# Patient Record
Sex: MALE | Race: WHITE | NOT HISPANIC OR LATINO | Employment: FULL TIME | URBAN - METROPOLITAN AREA
[De-identification: names, ages, dates, MRNs, and addresses within clinical notes are randomized per-mention and may not be internally consistent; named-entity substitution may affect disease eponyms.]

---

## 2023-12-23 ENCOUNTER — ANCILLARY PROCEDURE (OUTPATIENT)
Dept: RADIOLOGY | Facility: CLINIC | Age: 48
End: 2023-12-23
Payer: COMMERCIAL

## 2023-12-23 DIAGNOSIS — R05.9 COUGH, UNSPECIFIED TYPE: ICD-10-CM

## 2023-12-23 PROCEDURE — 71046 X-RAY EXAM CHEST 2 VIEWS: CPT | Performed by: RADIOLOGY

## 2023-12-23 PROCEDURE — 71046 X-RAY EXAM CHEST 2 VIEWS: CPT

## 2024-02-12 ENCOUNTER — APPOINTMENT (OUTPATIENT)
Dept: OTOLARYNGOLOGY | Facility: CLINIC | Age: 49
End: 2024-02-12
Payer: COMMERCIAL

## 2024-05-07 ENCOUNTER — OFFICE VISIT (OUTPATIENT)
Dept: OTOLARYNGOLOGY | Facility: CLINIC | Age: 49
End: 2024-05-07
Payer: COMMERCIAL

## 2024-05-07 VITALS — BODY MASS INDEX: 25.18 KG/M2 | HEIGHT: 69 IN | WEIGHT: 170 LBS

## 2024-05-07 DIAGNOSIS — J34.89 NASAL CONGESTION WITH RHINORRHEA: ICD-10-CM

## 2024-05-07 DIAGNOSIS — J31.0 CHRONIC RHINITIS: ICD-10-CM

## 2024-05-07 DIAGNOSIS — J30.89 ALLERGIC RHINITIS DUE TO OTHER ALLERGIC TRIGGER, UNSPECIFIED SEASONALITY: ICD-10-CM

## 2024-05-07 DIAGNOSIS — R09.81 NASAL CONGESTION WITH RHINORRHEA: ICD-10-CM

## 2024-05-07 DIAGNOSIS — R53.83 OTHER FATIGUE: Primary | ICD-10-CM

## 2024-05-07 PROCEDURE — 31231 NASAL ENDOSCOPY DX: CPT | Performed by: OTOLARYNGOLOGY

## 2024-05-07 PROCEDURE — 1036F TOBACCO NON-USER: CPT | Performed by: OTOLARYNGOLOGY

## 2024-05-07 PROCEDURE — 99204 OFFICE O/P NEW MOD 45 MIN: CPT | Performed by: OTOLARYNGOLOGY

## 2024-05-07 RX ORDER — LORATADINE 10 MG/1
10 TABLET ORAL DAILY
COMMUNITY

## 2024-05-07 RX ORDER — TRIAMCINOLONE ACETONIDE 55 UG/1
2 SPRAY, METERED NASAL DAILY
COMMUNITY

## 2024-05-07 ASSESSMENT — PATIENT HEALTH QUESTIONNAIRE - PHQ9
1. LITTLE INTEREST OR PLEASURE IN DOING THINGS: NOT AT ALL
SUM OF ALL RESPONSES TO PHQ9 QUESTIONS 1 AND 2: 0
2. FEELING DOWN, DEPRESSED OR HOPELESS: NOT AT ALL

## 2024-05-07 NOTE — PROGRESS NOTES
Chief Complaint:  Left-sided foreign body sensation    History Of Present Illness:  Reason For Visit:       Mariely presents to my clinic as a self-referred new patient evaluation for an intermittent sensation of a left-sided nasal foreign body.    He has a baseline history of allergies and typically during the spring he will use a combination of Nasacort, Claritin, and an eyedrop for the symptoms.  About once per year over the last 4 years (typically when he is not on his allergy medication) he will get a sensation in his left nasal cavity that would last between 2 and 3 days.  That sensation is like he has pepper in that nostril and can include left-sided discomfort, congestion, and eye related symptoms such as watering.  He does not notice eye redness during these events.  The last event was about 4 to 6 months ago.  Typically when this happens, he will resume Nasacort, Claritin, and his eyedrops with improvement.    He also mentioned not getting restful sleep (7/10) and wish to discuss whether or not this was an ENT issue.    Main Symptoms:  Patient has intermittent anterior nasal drainage that he attributes to his allergies.   Patient does not have  posterior nasal drainage.    Patient has mild nasal airway obstruction.   Patient does not have  facial pain.  Patient does not have  facial pressure.    Patient does not have decreased sense of smell.   Associated Symptoms:   Patient has  headaches.   Patient does not have throat clearing.    Patient does not have coughing.    Patient does not have dysphonia.   Patient does not have consistent nasal bleeding, but he has bled during the winter when he is dry.    Medications currently on for sinonasal symptoms: Claritin from March-July and sometimes in the fall, Nasacort 2 puffs each side Qday from March to July, allergy eye drops PRN, saline spray    Medications tried in the past for sinonasal symptoms:  Flonase, Zyrtec (discontinued secondary to side effects with  that medication)     Other Pertinent Medical Conditions:   Patient does not have asthma.    Patient does not have aspirin sensitivity.    Patient has migraines once per month. Patient is followed by a neurologist.   Patient does not have history of allergy testing.   Patient does not have history of sinus surgery.    Patient does not have history of nasal fracture.    Patient does not have heartburn.    The patient does not take medical therapy for heartburn.   The patient does not have imaging of sinuses.     Active Problems:  There is no problem list on file for this patient.     Past Medical History:  He has no past medical history on file.    Surgical History:  He has no past surgical history on file.     Family History:  No family history on file.    Social History:  He has no history on file for tobacco use, alcohol use, and drug use.     Allergies:  Patient has no allergy information on record.    Current Meds:  No current outpatient medications on file.    Vitals:  There were no vitals taken for this visit.     Physical Exam:  CONSTITUTIONAL:  Vitals reviewed in nursing chart, well developed, well nourished.    RESPIRATION:  Breathing comfortably, no stridor.  CV:  No clubbing/cyanosis/edema in hands.  EYES:  EOM Intact, sclera normal.  NEURO:  Alert and oriented times 3, Cranial nerves 2-12 intact and symmetric bilaterally.  HEAD AND FACE:  Skin with no masses or lesions, sinuses nontender to palpation.  SALIVARY GLANDS:  Parotid and submandibular glands normal bilaterally.  EARS:  Normal external ears, external auditory canals, and TMs to otoscopy, normal hearing to whispered voice.  NOSE:  External nose midline, anterior rhinoscopy is normal with limited visualization to the anterior aspect of the interior turbinates (see nasal endoscopy).  ORAL CAVITY/OROPHARYNX/LIPS:  Normal mucous membranes, normal floor of mouth/tongue/OP, no masses or lesions are noted.  NECK/LYMPH:  No LAD, no thyroid  masses.    SINONASAL ENDOSCOPY (CPT 32897):  To better evaluate the patient's symptoms, sinonasal endoscopy is indicated.  After discussion of risks and benefits, and topical decongestion and anesthesia, an endoscope was used to perform nasal endoscopy on each side.  A time out identifying the patient, the procedure, the location of the procedure and any concerns was performed prior to beginning the procedure.    Findings:  Examination of the right nasal cavity revealed anterior septal dryness.  The middle meatus and sphenoethmoid recess were normal without pus or polyps.  Nasopharynx normal.  Examination left nasal cavity revealed a normal middle meatus and sphenoethmoid recess without pus or polyps.  Nasopharynx was normal through the left nasal cavity.    Provider Impressions:  1.  Intermittent left nasal cavity foreign body sensation  2.  Rhinorrhea; rhinitis  3.  Nasal airway obstruction  4.  Migraines  5.  Seasonal allergic rhinitis  6.  Daytime fatigue    Discussion:  Mariely Santos and I discussed his current symptoms and exam.  He gets a few days of a foreign body sensation in the left nasal cavity about once per year and we discussed possible causes.  I did not see pus or polyps today.  His septum had some mild deviations but nothing significant.  His left nasal cavity may be slightly smaller than the right.  I am very suspicious that his symptoms are related to an excessive allergic exposure or potentially inhalation of something irritating.  We discussed several medical options including continuation of his current intranasal medical therapy and observation, more consistent use of his current intranasal medical therapy, or alternatives.  We discussed Astepro that is available over-the-counter and is a topical antihistamine.  He was comfortable beginning this and using a combination with Nasacort.  I left it up to him whether or not he wished to use both of these sprays consistently in the hopes  this would eliminate exacerbations.  We reviewed appropriate administration technique and dosing parameters.    In regard to imaging, I would hold off in the short-term but if the exacerbations become more frequent and more bothersome I think that indicating would be indicated.    In regard to his daytime fatigue, I recommended a sleep medicine consult with the expectation that he would undergo a polysomnogram.  We discussed the rationale for this.  This was ordered today.    As for next steps, if he continues to get intermittent exacerbations, he certainly could consider saline rinses for the events as this would be more active than saline spray.    I would like to see him virtually in about 6 months.  He was amenable to this and all questions were answered.    Patient Discussion/Summary:  Welcome to Dr. Ion Barth's clinic. We are here to assist you with your ENT needs at [facility] [place]. Dr. Barth is an ENT that specializes in nose, sinus, and skull base disorders.    Dr. Ion Barth's office number is [phone]. Please call this number to contact his care team regardless of which office you use to access care. This number is the most direct way to communicate with all the members of the care team.    Haydee Yee CNP is a nurse practitioner who is a part of Dr. Barth's team. She will work collaboratively with Dr. Barth to meet your goals. This often may include seeing you for more urgent appointments or follow-up visits under Dr. Barth's supervision.    Sania CHONGN is Dr. Barth's primary nurse. She can be reached by calling [phone]. Sania is available during business hours Tuesday through Friday. Haydee CHONGN is her rhinology nurse partner. Haydee is available during business hours Monday through Thursday. Messages left for them will be returned within one business day. Sania is also Dr. Barth's surgery scheduler and will  assist you with planning and scheduling of your surgery during her office hours.     Gely Morales is Dr. Barth's  and you can reach her at [phone]. She can help you with scheduling of appointments, general questions and information. She is available to receive calls Monday through Friday from 8:00 am until 4:25 pm.     For your convenience, Dr. Barth sees patients at [facility] and [facility] at [facility]. While we try to make your appointments as convenient as possible, occasionally a visit to another location may be necessary to provide the best care for you.    Dr. Barth makes every effort to run on time for your appointments. Therefore, if you are more than 25 minutes late, your appointment will need to be rescheduled to another day. We appreciate your understanding.     We look forward to working with you to meet your healthcare goals.     Signature:  Scribe Attestation  By signing my name below, Lucila GARCIA Scribe   attest that this documentation has been prepared under the direction and in the presence of Ion Barth MD.

## 2024-07-22 NOTE — PROGRESS NOTES
"       [facility]  [facility]  [address]  [facility] [address] [phone]     [facility]  New Visit Note      Subjective   Patient ID: Mariely Santos is a 49 y.o. male with past medical history significant for Sleep disorder breathing.     2024: The patient is here {pxarrival:62447} and was referred by Otolaryngology  Ion Barth MD  for comprehensive sleep medicine evaluation due to {SleepCC:35418}. ESS: MONICA:  , and neck circumference is  inches  today.    HPI  {OSAhx:70353}      SLEEP STUDY HISTORY: (personally reviewed raw data such as interpretation report, data sheet, hypnogram, and titration table if available and applicable)  ***    SLEEP-WAKE SCHEDULE  Bedtime: *** on weekdays, *** on weekends  Subjective sleep latency: ***  Problems falling asleep: ***  Number of awakenings: *** times per night spontaneously for unknown reasons  Falls back asleep in ***  Problems staying asleep: ***  Final wake time: *** on weekdays, *** on weekends  Out of bed time: *** on weekdays, *** on weekends  Shift work: ***  Naps: ***  Feels rested after a nap: {Yes/No:68430}  Average sleep duration (excluding naps): ***    SLEEP ENVIRONMENT  Sleep location: ***  Sleep status: {sleep status:88901}  Room is dark:  {Yes/No:76839::\"Yes\"}  Room is quiet: {Yes/No:94148::\"Yes\"}  Room is cool: {Yes/No:37259::\"Yes\"}  Bed comfort: good    SLEEP HABITS:   Activities before bedtime: ***  Activities in bed: ***  Preferred sleep position: {Sleep position:85644}    SLEEP ROS:  Night symptoms: {sleep apnea ROS at night:71666}  Morning symptoms: {sleep apnea ROS in mornin}  Daytime symptoms {sleep apnea ROS at daytime:57911}  Hypersomnia / narcolepsy symptoms: {narcolepsy ROS:13914}  RLS symptoms: {RLS symptoms:79003}  Movements in sleep: {sleep movements:22911}  Parasomnia symptoms: {parasomnia " ROS:29578}    WEIGHT: {weight:80542}    REVIEW OF SYSTEMS: All other systems have been reviewed and are negative.    PERTINENT SOCIAL HISTORY:  Occupation: employment status:35098}  Smoking: {Yes/No:85351}  ETOH: {Yes/No:61806}  Marijuana: {Yes/No:95264}  Caffeine: ***  Sleep aids: {Yes/No:64649}  Claustrophobia: {Yes/No:12399}    PERTINENT PAST SURGICAL HISTORY:  {surgical history pertinent in sleep:52399}    PERTINENT FAMILY HISTORY:  {family history in sleep:63155}    Active Problems, Allergy List, Medication List, and PMH/PSH/FH/Social Hx have been reviewed and reconciled in chart. No significant changes unless documented in the pertinent chart section. Updates made when necessary.       Objective     Physical Exam  Constitutional:alert and oriented to time, place, and person  Sinus: *** tenderness to palpation  Palate: Normal / Narrow / High-arched / *** torus palatini  Mallampati ***, *** Tongue scalloping, *** macroglossia  Lungs: Clear to auscultation bilateral, no rales  Heart: Regular rate and rhythm, no murmurs    Assessment/Plan   Mariely Santos is a 49 y.o. male who is seen to evaluate for ***possible/severe/ moderate/mild obstructive sleep apnea. The pathophysiology of sleep apnea, diagnostic testing (HST vs PSG), treatment options (PAP, oral appliance, surgery, hypoglossal nerve stimulator called Inspire), and supportive management (weight loss, positional therapy, smoking cessation, avoidance of alcohol and sedatives) were discussed with the patient in detail. Risk factors of sleep apnea as well as cardiometabolic and neurocognitive sequelae associated with untreated sleep apnea were also discussed. Lastly, patient was advised to avoid driving vehicle or operating heavy machinery when sleepy.     Mariely Santos with the following problems:     # SLEEP DISORDERED BREATHING:  -This is likely sleep apnea based on the the history and physical examination.   -Mariely Santoshas not yet  had a sleep study.  -Instruct patient to complete home/ in lab/ split night/ titration sleep study.  -HSAT is reasonable as patient likely has BITA based on history and exam and does not have any of the following comorbidities: CHF, neuromuscular weakness, hypoventilation, or significant COPD.  -We consider treatment as indicated when testing is complete.     # SLEEP APNEA:  -Per CMS criteria, the patient is not eligible for a pap to treat her Obstructive sleep apnea.   -Start/ Continue [] cmH20 []PAP through [email or web address].  -Sleep apnea and PAP therapy education were provided at length in the clinic today. Mariely Santos verbalized understanding.  -Emphasized diet, exercise, and weight loss in the clinic, as were non-supine sleep, avoiding alcohol in the late evening, and driving or operating heavy machinery when sleepy.  Mariely Santosverbalizes understanding of the above instructions and risks.    # BARIATRIC SURGERY INSTRUCTION:  -Please continuous using your CPAP to treat your sleep apnea.  -Bring your PAP and all equipment with you to surgery.  -Use your PAP with surgery and during recovery.  -Keep your head of the bed at 30* or higher.  -I advise judicious use of pain medications post operatively as pain medications can make sleep apnea worse.    -I advise close monitoring of the patient post operatively due to increased risk of complications related to sleep apnea.   -Mariely Santos may be at higher risk of postoperative respiratory complications.Mariely Santos understands the risk of post operative complications are higher for Mariely Santos is at increased but not prohibitive risk due to BITA.   -Mariely Hermes Moravan is optimized on PAP therapy for sleep apnea as long as Mariely Santos is compliant with PAP use per most recent download.  -Mariely Santos verbalizes understanding of the above instructions and risks.     # CHRONIC SLEEP  "ONSET/ SLEEP MAINTENANCE INSOMNIA:  -likely due to poor sleep hygiene, irregular sleep schedule, depression, anxiety, untreated sleep apnea, nocturia, RLS, delayed sleep phase syndrome, and chronic pain. [Meds] may be a contributing factor as well.  -MONICA:  -Sleep hygiene discuss in the clinic.    # DEPRESSION and ANXIETY:   -Mariely Kay taking [] and participating in psychotherapy.  -Denies HI/SI     # HYPERTENSION/ ATRIAL FIBRILLATION/ CAD/ CHF:  -Blood pressure was controlled today. To control her blood pressure better, instruct the patient to take anti-hypertension medication at bedtime and a water pill in the waking time.  -Denies headache, palpitation, and syncope in the clinic.  -Last Echo:  -Follows with PCP/ Cardiology     # MORBID OBESITY/OBESITY /OVERWEIGHT:  -with a BMI of []. Mariely Ochoalivan most recent Bicarbonate was No results found for: \"CO2\"   -Encourage to have regular exercise to manage weight well.  -Refer to a nutritionist for weight control.  -Bariatric surgery candidate.    # NASAL CONGESTION:  -Instruct Mariely Mirza Carlos Manuel use appropriate Flonase spray to ease congestion.  -Refer to Otolaryngology for underlying investigation.    # XEROSTOMIA:  -Instruct Mariely Ochoaheather purchase the Biotene gel to ease the dry mouth symptom,     # TOBACCO ABUSE:Mariely Santos is a current [] PPD smoker for [] years.  -Smoking Cessation given  -Decline Smoking Cessation     # RESTLESS LEG SYNDROME: This occurs frequently.  No results found for: \"IRON\", \"TRANSFERRIN\", \"IRONSAT\", \"TIBC\", \"FERRITIN\"  We will check a ferritin level today and start OTC iron replacement to ferritin of >75 as indicated.  Caffeine []. Eliminate  Tobacco []. Smoking cessation counseling provided.  Mariely Hermes Silver I discussed Mariely Santoscurrent medications that could be exacerbating Mariely Santos symptoms. Will continue [] for now.  Associated diseases [] and response " [].  Pramipexole  Ropinirole  Rotigitine  Gabapentin  Pregabalin  Opioids  Benzos     # CIRCADIAN RHYTHM SLEEP-WAKE DISORDER:  -We will obtain sleep logs for two weeks to be brought to next clinic to discuss. We will consider actigraphy to compare and/or confirm sleep log findings.  Delayed Sleep Phase:   -Set wake time, light therapy in the morning.   -Sleep hygiene measures at set bedtime.  Advanced Sleep Phase:   -Set bedtime and light therapy in the evening.   -Sleep hygiene measures at set bedtime.  Irregular Sleep Phase:  -Set bedtime and wake time.   -Daytime routine including scheduled physical activity, social activity and meal times.  Non-24 Sleep-Wake Rhythm:  -Set bedtime and wake time.   -Daytime routine including scheduled physical activity, social activity and meal times.  Shift Work:  -Maximize sleep time to 7-8 hours.   -Make sure the room is dark, quiet, cool and interruptions are eliminated.   -Avoid light in the mornings, wear dark sunglasses driving home.   -Expose self to bright light or daylight upon waking.   -Avoid caffeine 8 hours prior to sleeping.   Jet Lag:  -Try to change your sleep/wake schedule two to three days prior to travel.   -Expose yourself to bright light when you want to be awake.   -Avoid bright light and electronic light when you are nearing time to sleep.   -You can take melatonin OTC as needed 1 hour prior to bedtime as needed.     # SLEEPWALKING/ SLEEP EATING/ REM BEHAVIOR DISORDER:  -Instruct Mariely Santos to make sure self and bed partner are safe.  -Instruct Mariely Horowitz lock bedroom doors and windows.  -Instruct Mariely Horowitz hide his/her car keys.  -Instruct Mariely Horowitz pad headboard and move furniture away from the bed.  -Instruct Mariely Santos to put pillows in between him/her with bed partner if kicking or hitting. Consider sleeping separately.  -Instruct Mariely Santos to remove clutter and furniture from bedroom  floor to avoid injury. Consider moving Mariely Santos mattress to the floor.  -Instruct Mariely Santos to advise bed partner to calmly lead you back to bed with a gentle voice. Avoid touching and grabbing.  -Instruct Mariely Santos to  find evidence of sleep eating, especially if he/she is gaining weight, consider locking Mariely Santos fridge and pantry at night.  -We will consider a trial of low dose clonazepam 0.5 mg nightly.     #IDIOPATHIC HYPERSOMNIA VS. NARCOLEPSY:  - Will order an overnight sleep study, followed by MSLT the next day  - Perform urine toxicology prior to sleep study.  - Will call patient within 3 weeks after the test. Will discuss follow up plan at that time.  - May consider checking TSH and iron studies to rule out underlying metabolic etiologies.  -Scheduled naps  -Consolidate night time sleep.     *An OARRS report was reviewed and is consistent with prescribed medications.   *Considered the risks of abuse, dependence, addiction, and diversion and [] medication is felt to be clinically appropriate based on documented diagnosis.  *A controlled substance agreement was reviewed and signed today in clinic.   *Pending scanned copy in to the chart per office staff.    RTC      All of patient's questions were answered. He verbalizes understanding and agreement with my assessment and plan.

## 2024-07-31 ENCOUNTER — APPOINTMENT (OUTPATIENT)
Dept: SLEEP MEDICINE | Facility: HOSPITAL | Age: 49
End: 2024-07-31
Payer: COMMERCIAL

## 2024-08-26 ENCOUNTER — APPOINTMENT (OUTPATIENT)
Dept: SLEEP MEDICINE | Facility: CLINIC | Age: 49
End: 2024-08-26
Payer: COMMERCIAL

## 2024-08-26 VITALS
WEIGHT: 174.6 LBS | TEMPERATURE: 97.6 F | HEART RATE: 63 BPM | DIASTOLIC BLOOD PRESSURE: 66 MMHG | SYSTOLIC BLOOD PRESSURE: 105 MMHG | BODY MASS INDEX: 25.86 KG/M2 | HEIGHT: 69 IN

## 2024-08-26 DIAGNOSIS — G47.00 INSOMNIA, UNSPECIFIED TYPE: ICD-10-CM

## 2024-08-26 DIAGNOSIS — G47.30 SLEEP APNEA, UNSPECIFIED TYPE: Primary | ICD-10-CM

## 2024-08-26 DIAGNOSIS — R53.83 OTHER FATIGUE: ICD-10-CM

## 2024-08-26 DIAGNOSIS — G47.19 EXCESSIVE DAYTIME SLEEPINESS: ICD-10-CM

## 2024-08-26 PROCEDURE — 1036F TOBACCO NON-USER: CPT | Performed by: STUDENT IN AN ORGANIZED HEALTH CARE EDUCATION/TRAINING PROGRAM

## 2024-08-26 PROCEDURE — 3008F BODY MASS INDEX DOCD: CPT | Performed by: STUDENT IN AN ORGANIZED HEALTH CARE EDUCATION/TRAINING PROGRAM

## 2024-08-26 PROCEDURE — 99204 OFFICE O/P NEW MOD 45 MIN: CPT | Performed by: STUDENT IN AN ORGANIZED HEALTH CARE EDUCATION/TRAINING PROGRAM

## 2024-08-26 PROCEDURE — G2211 COMPLEX E/M VISIT ADD ON: HCPCS | Performed by: STUDENT IN AN ORGANIZED HEALTH CARE EDUCATION/TRAINING PROGRAM

## 2024-08-26 RX ORDER — ZOLMITRIPTAN 5 MG/1
TABLET, FILM COATED ORAL
COMMUNITY
Start: 2024-08-19

## 2024-08-26 ASSESSMENT — SLEEP AND FATIGUE QUESTIONNAIRES
WORRIED_DISTRESSED_DUE_TO_SLEEP: SOMEWHAT
SLEEP_PROBLEM_INTERFERES_DAILY_ACTIVITIES: SOMEWHAT
HOW LIKELY ARE YOU TO NOD OFF OR FALL ASLEEP WHILE SITTING QUIETLY AFTER LUNCH WITHOUT ALCOHOL: MODERATE CHANCE OF DOZING
SITING INACTIVE IN A PUBLIC PLACE LIKE A CLASS ROOM OR A MOVIE THEATER: MODERATE CHANCE OF DOZING
DIFFICULTY_STAYING_ASLEEP: MILD
HOW LIKELY ARE YOU TO NOD OFF OR FALL ASLEEP WHILE WATCHING TV: HIGH CHANCE OF DOZING
SATISFACTION_WITH_CURRENT_SLEEP_PATTERN: SATISFIED
HOW LIKELY ARE YOU TO NOD OFF OR FALL ASLEEP WHILE SITTING AND READING: HIGH CHANCE OF DOZING
HOW LIKELY ARE YOU TO NOD OFF OR FALL ASLEEP WHILE SITTING AND TALKING TO SOMEONE: WOULD NEVER DOZE
SLEEP_PROBLEM_NOTICEABLE_TO_OTHERS: SOMEWHAT
HOW LIKELY ARE YOU TO NOD OFF OR FALL ASLEEP WHEN YOU ARE A PASSENGER IN A CAR FOR AN HOUR WITHOUT A BREAK: SLIGHT CHANCE OF DOZING
ESS-CHAD TOTAL SCORE: 15
WAKING_TOO_EARLY: MILD
HOW LIKELY ARE YOU TO NOD OFF OR FALL ASLEEP IN A CAR, WHILE STOPPED FOR A FEW MINUTES IN TRAFFIC: SLIGHT CHANCE OF DOZING
HOW LIKELY ARE YOU TO NOD OFF OR FALL ASLEEP WHILE LYING DOWN TO REST IN THE AFTERNOON WHEN CIRCUMSTANCES PERMIT: HIGH CHANCE OF DOZING

## 2024-08-26 ASSESSMENT — ENCOUNTER SYMPTOMS
RESPIRATORY NEGATIVE: 1
NEUROLOGICAL NEGATIVE: 1
CARDIOVASCULAR NEGATIVE: 1
PSYCHIATRIC NEGATIVE: 1
CONSTITUTIONAL NEGATIVE: 1

## 2024-08-26 NOTE — ASSESSMENT & PLAN NOTE
- we will start work-up with Home sleep apnea test (HSAT)  - lengthy discussion on BITA and sleep study education as well as the tips to be successful with the sleep study  - patient voiced understanding

## 2024-08-26 NOTE — ASSESSMENT & PLAN NOTE
Symptomatic in terms of daytime sleepiness.  It's possible that it's 2/2 undiagnosed/untreated BITA  Typically have good exercise and physical activity.  We will get HSAT first, and continue to monitor

## 2024-08-26 NOTE — PROGRESS NOTES
Patient: Mariely Santos    [id number]  : 1975 -- AGE 49 y.o.    Provider: Tanner Schofield MD     Location [facility]   Service Date: 2024              [facility]  New Visit Note      HISTORY OF PRESENT ILLNESS     The patient's referring provider is: Ion Barth MD; Gregory Salmeron MD    HISTORY OF PRESENT ILLNESS   Mariely Santos is a 49 y.o. male with h/o  excessive daytime sleepiness  who presents to a [facility] for a sleep medicine evaluation with concerns of Sleep Apnea and Unrefreshing Sleep.     Past Sleep History  Patient has the following sleep-related diagnoses: none. Prior sleep study results: not done    Current History    On today's visit, the patient reports that his main concern is unrefreshing sleep.  He would wake up couple times a night and he feels pretty sleepy throughout the day.  He has three children: 15, 13, 11 years of age.  He works as a civil litigation .  He has been feeling like this for about couple of years now.  He does snore a bit but not terrible.  He is otherwise generally healthy with good amount of physical activities.    Sleep schedule:      Weekdays / Work Days Weekends / Days Off   Bedtime 11:45 pm  12 AM   Sleep latency 5 min same as weekdays   Wake time 6:30 am  8 AM   Total sleep time  Average/day:  Total sleep time 6-7 hours/day Total sleep time 7-8 hours/day   Naps Yes, for 1-2times per week for 15min. Naps are refreshing   Nocturnal awakenings Yes, about 2-3 times a night     Preferred sleeping position: SLEEP POSITION: prone and sidelying    Sleep-related ROS:    Sleep Initiation: no problems going to sleep  Problems going to sleep associated with: No problems going to sleep    Sleep Maintenance: wakes up about 2-3 times per night and easily returns to sleep after awakening  Problems staying asleep associated with: Problems Staying Asleep: nocturia and  no clear reason    Breathing during sleep: snoring, witnessed apneas, and gasping/choking for air    RLS screen:  RLSSCREEN: - Sensations: Patient does not have unusual sensations in their extremities that cause an urge to move them     Daytime Symptoms  On awakening patient reports: no morning symptoms    Patient reports DAYTIME SYMPTOMS: sleep inertia and excessively sleepy during the day  Patient denies daytime symptoms including: Denies: feeling sleepy when driving  Fatigue: symptoms bothersome, but easily able to carry out all usual work/school/family activities    ESS: 15  MONICA: 9  FOSQ: 27      REVIEW OF SYSTEMS     REVIEW OF SYSTEMS  Review of Systems   Constitutional: Negative.    HENT: Negative.     Respiratory: Negative.     Cardiovascular: Negative.    Genitourinary: Negative.    Skin: Negative.    Neurological: Negative.    Psychiatric/Behavioral: Negative.       SLEEP ROS: snoring, snorting, choking, periods of not breathing      ALLERGIES AND MEDICATIONS     ALLERGIES  Allergies   Allergen Reactions    Pollen Extracts Agitation    Latex Rash       MEDICATIONS  Current Outpatient Medications   Medication Sig Dispense Refill    loratadine (Claritin) 10 mg tablet Take 1 tablet (10 mg) by mouth once daily.      triamcinolone (Nasacort) 55 mcg nasal inhaler Administer 2 sprays into each nostril once daily.      ZOLMitriptan (Zomig) 5 mg tablet PLEASE SEE ATTACHED FOR DETAILED DIRECTIONS       No current facility-administered medications for this visit.         PAST HISTORY     PAST MEDICAL HISTORY  He  has no past medical history on file.    PAST SURGICAL HISTORY:  History reviewed. No pertinent surgical history.    FAMILY HISTORY  No family history on file.    He does not have a family history of sleep disorder.    SOCIAL HISTORY  He  reports that he has never smoked. He has never used smokeless tobacco. No history on file for alcohol use and drug use. He currently lives with his wife.     Caffeine  "consumption: Yes, 3 cups/day  Alcohol consumption: No  Smoking: No  Marijuana: No      PHYSICAL EXAM     VITAL SIGNS: /66 (BP Location: Right arm, Patient Position: Sitting, BP Cuff Size: Adult)   Pulse 63   Temp 36.4 °C (97.6 °F) (Temporal)   Ht 1.753 m (5' 9\")   Wt 79.2 kg (174 lb 9.6 oz)   BMI 25.78 kg/m²      CURRENT WEIGHT:   Vitals:    08/26/24 0835   Weight: 79.2 kg (174 lb 9.6 oz)     Body mass index is 25.78 kg/m².  PREVIOUS WEIGHTS:  Wt Readings from Last 3 Encounters:   08/26/24 79.2 kg (174 lb 9.6 oz)   05/07/24 77.1 kg (170 lb)       Physical Exam  Vitals reviewed.   Constitutional:       General: He is not in acute distress.     Appearance: Normal appearance. He is well-developed and normal weight.   HENT:      Head: Normocephalic and atraumatic.      Nose: Nose normal. No congestion or rhinorrhea.      Mouth/Throat:      Mouth: Mucous membranes are moist.      Pharynx: Oropharynx is clear. No oropharyngeal exudate.   Eyes:      General: No scleral icterus.     Extraocular Movements: Extraocular movements intact.      Conjunctiva/sclera: Conjunctivae normal.      Pupils: Pupils are equal, round, and reactive to light.   Neck:      Thyroid: No thyroid mass or thyroid tenderness.      Vascular: No JVD.   Cardiovascular:      Rate and Rhythm: Normal rate.      Pulses: Normal pulses.   Pulmonary:      Effort: Pulmonary effort is normal. No respiratory distress.   Musculoskeletal:      Cervical back: Normal range of motion and neck supple. No rigidity or tenderness.   Lymphadenopathy:      Cervical: No cervical adenopathy.   Neurological:      Mental Status: He is alert and oriented to person, place, and time.   Psychiatric:         Mood and Affect: Mood normal.         Behavior: Behavior normal.         Thought Content: Thought content normal.       PHYSICAL EXAM: MODIFIED MALLAMPATI SCORE: III (soft and hard palate and base of uvula visible)  TONGUE SCALLOPING: with scalloping      RESULTS/DATA " "    No results found for: \"CO2\", \"IRON\", \"TRANSFERRIN\", \"IRONSAT\", \"TIBC\", \"FERRITIN\"          ASSESSMENT/PLAN     Mr. Santos is a 49 y.o. male and He was referred to the [facility] for evaluation of BITA    Problem List, Orders, Assessment, Recommendations:  Problem List Items Addressed This Visit             ICD-10-CM    Sleep apnea - Primary G47.30     - we will start work-up with Home sleep apnea test (HSAT)  - lengthy discussion on BITA and sleep study education as well as the tips to be successful with the sleep study  - patient voiced understanding           Relevant Orders    Home sleep apnea test (HSAT)    Excessive daytime sleepiness G47.19     Symptomatic in terms of daytime sleepiness.  It's possible that it's 2/2 undiagnosed/untreated BITA  Typically have good exercise and physical activity.  We will get HSAT first, and continue to monitor         Insomnia G47.00     Maintenance issue mainly, possibly 2/2 undiagnosed/untreated BITA            Other Visit Diagnoses         Codes    Other fatigue     R53.83            Disposition    Will call with sleep study results and determine F/U plan         "

## 2024-11-11 ENCOUNTER — APPOINTMENT (OUTPATIENT)
Dept: OTOLARYNGOLOGY | Facility: CLINIC | Age: 49
End: 2024-11-11
Payer: COMMERCIAL